# Patient Record
Sex: MALE | Race: WHITE | Employment: FULL TIME | URBAN - METROPOLITAN AREA
[De-identification: names, ages, dates, MRNs, and addresses within clinical notes are randomized per-mention and may not be internally consistent; named-entity substitution may affect disease eponyms.]

---

## 2024-05-22 ENCOUNTER — HOSPITAL ENCOUNTER (EMERGENCY)
Age: 59
Discharge: HOME | End: 2024-05-22

## 2024-05-22 VITALS
RESPIRATION RATE: 16 BRPM | OXYGEN SATURATION: 98 % | TEMPERATURE: 97.8 F | SYSTOLIC BLOOD PRESSURE: 182 MMHG | HEART RATE: 70 BPM | DIASTOLIC BLOOD PRESSURE: 98 MMHG | HEIGHT: 72 IN | WEIGHT: 255 LBS | BODY MASS INDEX: 34.54 KG/M2

## 2024-05-22 DIAGNOSIS — L03.115 CELLULITIS OF RIGHT LOWER LEG: Primary | ICD-10-CM

## 2024-05-22 PROCEDURE — 99283 EMERGENCY DEPT VISIT LOW MDM: CPT

## 2024-05-22 RX ORDER — CLINDAMYCIN HYDROCHLORIDE 300 MG/1
300 CAPSULE ORAL 3 TIMES DAILY
Qty: 30 CAPSULE | Refills: 0 | Status: SHIPPED | OUTPATIENT
Start: 2024-05-22 | End: 2024-06-01

## 2024-05-22 ASSESSMENT — PAIN - FUNCTIONAL ASSESSMENT: PAIN_FUNCTIONAL_ASSESSMENT: NONE - DENIES PAIN

## 2024-05-22 NOTE — DISCHARGE INSTRUCTIONS
I do agree this may be an emerging cellulitis.  I do find it reasonable to initiate antibiotic based on your history.  I did send prescription for clindamycin 300 mg take this 3 times a day for the next 10 days.  This was sent to [facility] pharmacy in [facility].  I do recommend taking 2 capsules first dose.  If any loose stool or abdominal cramping I recommend the use of probiotic such as Florastor.

## 2024-05-22 NOTE — ED PROVIDER NOTES
[facility] ED  EMERGENCY DEPARTMENT ENCOUNTER        Pt Name: Vipul Rich  MRN: [id number]  Birthdate 1965  Date of evaluation: 5/22/2024  Provider: Savage Subramanian PA-C  PCP: No primary care provider on file.  Note Started: 3:58 PM EDT 5/22/24      REINIER. I have evaluated this patient.        CHIEF COMPLAINT       Chief Complaint   Patient presents with    Leg Swelling     States thinks cellulitis starting on R leg. Starting on Sunday.        HISTORY OF PRESENT ILLNESS: 1 or more Elements     History From: Patient    Vipul Rich is a 58 y.o. male who presents to the [facility] department with his wife Amaya.  The patient with complaint of of redness and irritation involving the right lower leg.  He reports onset Sunday for 48 hours ago.  Was doing some bike riding had some new socks and boots.  This caused irritation with minor itching.  He is used topical hydrocortisone cream.  The patient states this reminds him of cellulitis occurring 8.5 years ago at which time he was on antibiotics which did help.  He does have some fluid erythema, see picture.  No systemic ill complaints are related.  No trauma.    Nursing Notes were all reviewed and agreed with or any disagreements were addressed in the HPI.    REVIEW OF SYSTEMS :      Review of Systems    Positives and Pertinent negatives as per HPI.     SURGICAL HISTORY     Past Surgical History:   Procedure Laterality Date    HERNIA REPAIR         CURRENTMEDICATIONS       Previous Medications    No medications on file       ALLERGIES     Patient has no known allergies.    FAMILYHISTORY     History reviewed. No pertinent family history.     SOCIAL HISTORY          SCREENINGS                                   CIWA Assessment  BP: (!) 193/117  Pulse: 76             PHYSICAL EXAM  1 or more Elements     ED Triage Vitals [05/22/24 1531]   BP Temp Temp Source Pulse Respirations SpO2 Height Weight - Scale   (!) 193/117 97.8 °F (36.6 °C) Oral 76 18 97 % 1.829